# Patient Record
Sex: FEMALE | Race: BLACK OR AFRICAN AMERICAN | URBAN - METROPOLITAN AREA
[De-identification: names, ages, dates, MRNs, and addresses within clinical notes are randomized per-mention and may not be internally consistent; named-entity substitution may affect disease eponyms.]

---

## 2021-03-18 ENCOUNTER — WEB ENCOUNTER (OUTPATIENT)
Dept: URBAN - METROPOLITAN AREA CLINIC 17 | Facility: CLINIC | Age: 36
End: 2021-03-18

## 2021-03-18 ENCOUNTER — OFFICE VISIT (OUTPATIENT)
Dept: URBAN - METROPOLITAN AREA CLINIC 17 | Facility: CLINIC | Age: 36
End: 2021-03-18
Payer: COMMERCIAL

## 2021-03-18 ENCOUNTER — DASHBOARD ENCOUNTERS (OUTPATIENT)
Age: 36
End: 2021-03-18

## 2021-03-18 DIAGNOSIS — K59.02 CONSTIPATION BY OUTLET DYSFUNCTION: ICD-10-CM

## 2021-03-18 PROBLEM — 14760008: Status: ACTIVE | Noted: 2021-03-18

## 2021-03-18 PROCEDURE — 99203 OFFICE O/P NEW LOW 30 MIN: CPT | Performed by: INTERNAL MEDICINE

## 2021-03-18 RX ORDER — LUBIPROSTONE 24 UG/1
1 CAPSULE WITH FOOD AND WATER CAPSULE, GELATIN COATED ORAL TWICE A DAY
Status: ACTIVE | COMMUNITY

## 2021-03-18 RX ORDER — LINACLOTIDE 145 UG/1
1 CAPSULE AT LEAST 30 MINUTES BEFORE THE FIRST MEAL CAPSULE, GELATIN COATED ORAL ONCE A DAY
Qty: 30 | Refills: 2 | OUTPATIENT
Start: 2021-03-18 | End: 2021-06-16

## 2021-03-18 NOTE — HPI-TODAY'S VISIT:
This is a 36 yo female referred for chronic constipation which has been an ongoing problem fro several years.  She went to Urgent Care 2015 for abodminal pain.  An Xray revealed a large amount of stool in the colon.  She has taken multiple OTC products.   She denies  rectal bleeding, change in bowel habits and a family history of colon cancer and colon polyps.  She was prescribed Amitiza 24 mcg two weeks ago which results in bowel movements every other day.  She reports bloating, denies nausea, vomiting, and GERD.

## 2021-03-19 LAB
T4,FREE(DIRECT): 1.34
TSH: 0.52